# Patient Record
Sex: MALE | Race: OTHER
[De-identification: names, ages, dates, MRNs, and addresses within clinical notes are randomized per-mention and may not be internally consistent; named-entity substitution may affect disease eponyms.]

---

## 2020-05-10 ENCOUNTER — HOSPITAL ENCOUNTER (EMERGENCY)
Dept: HOSPITAL 72 - EMR | Age: 25
LOS: 1 days | Discharge: HOME | End: 2020-05-11
Payer: SELF-PAY

## 2020-05-10 VITALS — SYSTOLIC BLOOD PRESSURE: 130 MMHG | DIASTOLIC BLOOD PRESSURE: 84 MMHG

## 2020-05-10 VITALS — DIASTOLIC BLOOD PRESSURE: 98 MMHG | SYSTOLIC BLOOD PRESSURE: 120 MMHG

## 2020-05-10 VITALS — DIASTOLIC BLOOD PRESSURE: 103 MMHG | SYSTOLIC BLOOD PRESSURE: 143 MMHG

## 2020-05-10 VITALS — DIASTOLIC BLOOD PRESSURE: 89 MMHG | SYSTOLIC BLOOD PRESSURE: 137 MMHG

## 2020-05-10 VITALS — SYSTOLIC BLOOD PRESSURE: 127 MMHG | DIASTOLIC BLOOD PRESSURE: 82 MMHG

## 2020-05-10 VITALS — SYSTOLIC BLOOD PRESSURE: 138 MMHG | DIASTOLIC BLOOD PRESSURE: 98 MMHG

## 2020-05-10 VITALS — SYSTOLIC BLOOD PRESSURE: 143 MMHG | DIASTOLIC BLOOD PRESSURE: 103 MMHG

## 2020-05-10 VITALS — SYSTOLIC BLOOD PRESSURE: 124 MMHG | DIASTOLIC BLOOD PRESSURE: 84 MMHG

## 2020-05-10 VITALS — DIASTOLIC BLOOD PRESSURE: 82 MMHG | SYSTOLIC BLOOD PRESSURE: 127 MMHG

## 2020-05-10 VITALS — SYSTOLIC BLOOD PRESSURE: 124 MMHG | DIASTOLIC BLOOD PRESSURE: 96 MMHG

## 2020-05-10 VITALS — SYSTOLIC BLOOD PRESSURE: 130 MMHG | DIASTOLIC BLOOD PRESSURE: 65 MMHG

## 2020-05-10 VITALS — DIASTOLIC BLOOD PRESSURE: 92 MMHG | SYSTOLIC BLOOD PRESSURE: 140 MMHG

## 2020-05-10 VITALS — SYSTOLIC BLOOD PRESSURE: 120 MMHG | DIASTOLIC BLOOD PRESSURE: 98 MMHG

## 2020-05-10 VITALS — WEIGHT: 315 LBS | HEIGHT: 68 IN | BODY MASS INDEX: 47.74 KG/M2

## 2020-05-10 VITALS — DIASTOLIC BLOOD PRESSURE: 48 MMHG | SYSTOLIC BLOOD PRESSURE: 136 MMHG

## 2020-05-10 VITALS — SYSTOLIC BLOOD PRESSURE: 138 MMHG | DIASTOLIC BLOOD PRESSURE: 99 MMHG

## 2020-05-10 VITALS — DIASTOLIC BLOOD PRESSURE: 75 MMHG | SYSTOLIC BLOOD PRESSURE: 134 MMHG

## 2020-05-10 VITALS — SYSTOLIC BLOOD PRESSURE: 140 MMHG | DIASTOLIC BLOOD PRESSURE: 92 MMHG

## 2020-05-10 VITALS — SYSTOLIC BLOOD PRESSURE: 122 MMHG | DIASTOLIC BLOOD PRESSURE: 94 MMHG

## 2020-05-10 VITALS — SYSTOLIC BLOOD PRESSURE: 110 MMHG | DIASTOLIC BLOOD PRESSURE: 92 MMHG

## 2020-05-10 VITALS — DIASTOLIC BLOOD PRESSURE: 78 MMHG | SYSTOLIC BLOOD PRESSURE: 132 MMHG

## 2020-05-10 DIAGNOSIS — F91.9: Primary | ICD-10-CM

## 2020-05-10 DIAGNOSIS — R45.851: ICD-10-CM

## 2020-05-10 DIAGNOSIS — F23: ICD-10-CM

## 2020-05-10 LAB
ADD MANUAL DIFF: NO
ALBUMIN SERPL-MCNC: 4.1 G/DL (ref 3.4–5)
ALBUMIN/GLOB SERPL: 1.1 {RATIO} (ref 1–2.7)
ALP SERPL-CCNC: 66 U/L (ref 46–116)
ALT SERPL-CCNC: 99 U/L (ref 12–78)
ANION GAP SERPL CALC-SCNC: 11 MMOL/L (ref 5–15)
APPEARANCE UR: CLEAR
APTT PPP: (no result) S
AST SERPL-CCNC: 42 U/L (ref 15–37)
BASOPHILS NFR BLD AUTO: 1 % (ref 0–2)
BILIRUB SERPL-MCNC: 0.4 MG/DL (ref 0.2–1)
BUN SERPL-MCNC: 9 MG/DL (ref 7–18)
CALCIUM SERPL-MCNC: 9.4 MG/DL (ref 8.5–10.1)
CHLORIDE SERPL-SCNC: 106 MMOL/L (ref 98–107)
CO2 SERPL-SCNC: 24 MMOL/L (ref 21–32)
CREAT SERPL-MCNC: 0.9 MG/DL (ref 0.55–1.3)
EOSINOPHIL NFR BLD AUTO: 0.7 % (ref 0–3)
ERYTHROCYTE [DISTWIDTH] IN BLOOD BY AUTOMATED COUNT: 11.7 % (ref 11.6–14.8)
GLOBULIN SER-MCNC: 3.9 G/DL
GLUCOSE UR STRIP-MCNC: NEGATIVE MG/DL
HCT VFR BLD CALC: 43.8 % (ref 42–52)
HGB BLD-MCNC: 16.2 G/DL (ref 14.2–18)
KETONES UR QL STRIP: NEGATIVE
LEUKOCYTE ESTERASE UR QL STRIP: NEGATIVE
LYMPHOCYTES NFR BLD AUTO: 21.4 % (ref 20–45)
MCV RBC AUTO: 91 FL (ref 80–99)
MONOCYTES NFR BLD AUTO: 6.7 % (ref 1–10)
NEUTROPHILS NFR BLD AUTO: 70.3 % (ref 45–75)
NITRITE UR QL STRIP: NEGATIVE
PH UR STRIP: 7 [PH] (ref 4.5–8)
PLATELET # BLD: 225 K/UL (ref 150–450)
POTASSIUM SERPL-SCNC: 3.9 MMOL/L (ref 3.5–5.1)
PROT UR QL STRIP: NEGATIVE
RBC # BLD AUTO: 4.81 M/UL (ref 4.7–6.1)
SODIUM SERPL-SCNC: 141 MMOL/L (ref 136–145)
SP GR UR STRIP: 1 (ref 1–1.03)
UROBILINOGEN UR-MCNC: NORMAL MG/DL (ref 0–1)
WBC # BLD AUTO: 8.3 K/UL (ref 4.8–10.8)

## 2020-05-10 PROCEDURE — 36415 COLL VENOUS BLD VENIPUNCTURE: CPT

## 2020-05-10 PROCEDURE — 85025 COMPLETE CBC W/AUTO DIFF WBC: CPT

## 2020-05-10 PROCEDURE — 80053 COMPREHEN METABOLIC PANEL: CPT

## 2020-05-10 PROCEDURE — 96372 THER/PROPH/DIAG INJ SC/IM: CPT

## 2020-05-10 PROCEDURE — 80307 DRUG TEST PRSMV CHEM ANLYZR: CPT

## 2020-05-10 PROCEDURE — 81003 URINALYSIS AUTO W/O SCOPE: CPT

## 2020-05-10 PROCEDURE — 99285 EMERGENCY DEPT VISIT HI MDM: CPT

## 2020-05-10 NOTE — NUR
ED Nurse Note:



Pt informed of ERMD desire to initiate additional 1L of NS and need for new iv 
access establishment. Pt refused. PT medically cleared. ERMD notified of 
refusal and clearance.

## 2020-05-10 NOTE — NUR
ED Nurse Note:



Pt resting in bed. Pt offered food and drinks, pt refused. pt offered 
assistance to restroom, pt refused. ERMD aware. pt aao x 4, calm and 
cooperative. will continue to monitor.

## 2020-05-10 NOTE — NUR
ED Nurse Note:



Pt came in with gray shirt and shorts but shirt was teared up; clothes remained 
with pt right now. Black shoes placed on psych locker #3.

## 2020-05-10 NOTE — NUR
ED Nurse Note:



Noted pt with mild anxiety; pt stated, "I need to get out of here, I need to 
call my mom."

## 2020-05-10 NOTE — NUR
ED Nurse Note:



Pt assessed and informed of possible restraint removal parameters. Pt agreed. 
pt able to fully verbalize needs and thought process. Pt pleasant and polite. 
Pt resting in bed comfortably, compliant with following commands and 
guidelines. Sitter placed at bedside, restraints removed. will continue to 
monitor.

## 2020-05-10 NOTE — NUR
ED Nurse Note:



Pt was found not on his bed, stated "I want to leave!", called security; 
notified ERMD and charge nurse; pt uncompliant to care, consistently yelling. 
Placed back to bed; safety measures in placed. Skin intact; no redness nor 
swelling on bilateral wrist. Dim lights on pt's room. VSS, on RA, NAD. Will 
contine to monitor.

## 2020-05-10 NOTE — NUR
ED Nurse Note:



Pt was brought in ambulance from home d/t behavioral complaint. Per EMS pt 
smoked meth yesterday and hear voices. Pt wants to hang himself. Placed on bed 
and gown; hooked to cardiac monitor. VSS, on RA, afebrile on triage. Safety 
measures in placed. Will continue to monitor.

## 2020-05-10 NOTE — EMERGENCY ROOM REPORT
History of Present Illness


General


Chief Complaint:  Behavioral Complaint


Source:  Patient, EMS


 (Joe Rangel MD)





Present Illness


HPI


Patient is a 24-year-old male brought in by EMS with [employer] after increased 

agitation at home.  Patient reportedly had been in a psychiatric facility in 

the past.  Patient was placed on a 5150 hold by [employer].  Reportedly had 

threatened to harm himself.  Reportedly had been using methamphetamine.  

Patient denies any suicidal thoughts or thoughts of self-harm and states he was 

only eating cake.


 (Joe Rangel MD)


Allergies:  


Coded Allergies:  


     No Known Allergies (Unverified , 5/10/20)





COVID-19 Screening


Contact w/high risk pt:  No


Recent Travel to affected area:  No


Experienced COVID-19 symptoms?:  No


 (Joe Rangel MD)





Patient History


Past Medical History:  see triage record


Reviewed Nursing Documentation:  PMH: Agreed; PSxH: Agreed (Joe Rangel MD)





Nursing Documentation-PMH


Past Medical History:  No Stated History


 (Joe Rangel MD)





Review of Systems


All Other Systems:  negative except mentioned in HPI


 (Joe Rangel MD)





Physical Exam





Vital Signs








  Date Time  Temp Pulse Resp B/P (MAP) Pulse Ox O2 Delivery O2 Flow Rate FiO2


 


5/10/20 10:35 99.1 103 18 124/84 (97) 98 Room Air  








Sp02 EP Interpretation:  reviewed, normal


General Appearance:  normal inspection, alert, obese


Head:  atraumatic


ENT:  normal ENT inspection, hearing grossly normal, normal voice


Neck:  normal inspection, full range of motion, supple, no bony tend


Respiratory:  normal inspection, lungs clear, normal breath sounds, no 

respiratory distress, no retraction, no wheezing


Cardiovascular #1:  regular rate, rhythm, no edema


Gastrointestinal:  normal inspection, normal bowel sounds, non tender, soft, no 

guarding, no hernia


Genitourinary:  no CVA tenderness


Musculoskeletal:  normal inspection, back normal, normal range of motion


Neurologic:  alert, motor strength/tone normal, CNs III-XII nml as tested, 

sensory intact, responsive, speech normal, normal inspection


Psychiatric:  mood/affect normal, anxious


 (Joe Rangel MD)





Medical Decision Making


Diagnostic Impression:  


 Primary Impression:  


 Behavioral disorder


 Additional Impression:  


 Psychosis


ER Course


Patient presented for agitation.  Differential diagnoses include substance abuse

, psychosis, bipolar disorder, depression, malingering .  because of complexity 

of patient's case laboratory tests and imaging studies were ordered Patient was 

noted to have increased agitation and was given Benadryl held Ativan and 

Haldol.  He was placed in restraints.  Patient was noted to be 5150 hold placed 

by [employer].  Patient was medically cleared for psychiatric evaluation as needed.  

Currently denies suicidal thoughts.





Labs








Test


  5/10/20


10:45


 


White Blood Count


  8.3 K/UL


(4.8-10.8)


 


Red Blood Count


  4.81 M/UL


(4.70-6.10)


 


Hemoglobin


  16.2 G/DL


(14.2-18.0)


 


Hematocrit


  43.8 %


(42.0-52.0)


 


Mean Corpuscular Volume 91 FL (80-99) 


 


Mean Corpuscular Hemoglobin


  33.6 PG


(27.0-31.0)


 


Mean Corpuscular Hemoglobin


Concent 36.9 G/DL


(32.0-36.0)


 


Red Cell Distribution Width


  11.7 %


(11.6-14.8)


 


Platelet Count


  225 K/UL


(150-450)


 


Mean Platelet Volume


  7.6 FL


(6.5-10.1)


 


Neutrophils (%) (Auto)


  70.3 %


(45.0-75.0)


 


Lymphocytes (%) (Auto)


  21.4 %


(20.0-45.0)


 


Monocytes (%) (Auto)


  6.7 %


(1.0-10.0)


 


Eosinophils (%) (Auto)


  0.7 %


(0.0-3.0)


 


Basophils (%) (Auto)


  1.0 %


(0.0-2.0)


 


Urine Color Pale yellow 


 


Urine Appearance Clear 


 


Urine pH 7 (4.5-8.0) 


 


Urine Specific Gravity


  1.005


(1.005-1.035)


 


Urine Protein


  Negative


(NEGATIVE)


 


Urine Glucose (UA)


  Negative


(NEGATIVE)


 


Urine Ketones


  Negative


(NEGATIVE)


 


Urine Blood


  Negative


(NEGATIVE)


 


Urine Nitrite


  Negative


(NEGATIVE)


 


Urine Bilirubin


  Negative


(NEGATIVE)


 


Urine Urobilinogen


  Normal MG/DL


(0.0-1.0)


 


Urine Leukocyte Esterase


  Negative


(NEGATIVE)


 


Sodium Level


  141 MMOL/L


(136-145)


 


Potassium Level


  3.9 MMOL/L


(3.5-5.1)


 


Chloride Level


  106 MMOL/L


()


 


Carbon Dioxide Level


  24 MMOL/L


(21-32)


 


Anion Gap


  11 mmol/L


(5-15)


 


Blood Urea Nitrogen 9 mg/dL (7-18) 


 


Creatinine


  0.9 MG/DL


(0.55-1.30)


 


Estimat Glomerular Filtration


Rate > 60 mL/min


(>60)


 


Glucose Level


  104 MG/DL


()


 


Calcium Level


  9.4 MG/DL


(8.5-10.1)


 


Total Bilirubin


  0.4 MG/DL


(0.2-1.0)


 


Aspartate Amino Transf


(AST/SGOT) 42 U/L (15-37) 


 


 


Alanine Aminotransferase


(ALT/SGPT) 99 U/L (12-78) 


 


 


Alkaline Phosphatase


  66 U/L


()


 


Total Protein


  8.0 G/DL


(6.4-8.2)


 


Albumin


  4.1 G/DL


(3.4-5.0)


 


Globulin 3.9 g/dL 


 


Albumin/Globulin Ratio 1.1 (1.0-2.7) 


 


Salicylates Level


  0.5 ug/mL


(2.8-20)


 


Urine Opiates Screen


  Negative


(NEGATIVE)


 


Acetaminophen Level


  < 2 MCG/ML


(10-30)


 


Urine Barbiturates Screen


  Negative


(NEGATIVE)


 


Phencyclidine (PCP) Screen


  Negative


(NEGATIVE)


 


Urine Amphetamines Screen


  Negative


(NEGATIVE)


 


Urine Benzodiazepines Screen


  Negative


(NEGATIVE)


 


Urine Cocaine Screen


  Negative


(NEGATIVE)


 


Urine Marijuana (THC) Screen


  Positive


(NEGATIVE)


 


Serum Alcohol < 3 mg/dL 








 (Joe Rangel MD)


ER Course


I reevaluated patient in the morning.  On 5150 hold.  Patient not endorsing SI 

or HI.





I do not believe patient requires emergent psychiatric placement.  We will call 

Dr. Parekh to clear patient.








 


 (Casey Veras MD)


ER Course


Patient signed out to me from previous provider pending psychiatric evaluation.

  





Briefly, the patient is on a 5150 hold for suicidal thoughts.  He has been in 

the emergency department for nearly 30 hours.  Denies SI/HI at this time.  Dr. Parekh has evaluated the patient and lifted the 5150 hold.  Patient will be 

discharged to follow-up with his mental health team.  No complaints or health 

concerns from the patient at this time.


 (Harpreet Langston MD)





Last Vital Signs








  Date Time  Temp Pulse Resp B/P (MAP) Pulse Ox O2 Delivery O2 Flow Rate FiO2


 


5/10/20 11:00 99.1  18 124/84 98 Room Air  


 


5/10/20 11:00  103      








 (Joe Rangel MD)


Disposition:  HOME, SELF-CARE


Condition:  Stable


Referrals:  


NOT CHOSEN IPA/MD,REFERRING (PCP)











Joe Rangel MD May 10, 2020 11:35


Casey Veras MD May 11, 2020 14:13


Harpreet Langston MD May 11, 2020 16:06

## 2020-05-11 VITALS — DIASTOLIC BLOOD PRESSURE: 60 MMHG | SYSTOLIC BLOOD PRESSURE: 131 MMHG

## 2020-05-11 VITALS — DIASTOLIC BLOOD PRESSURE: 56 MMHG | SYSTOLIC BLOOD PRESSURE: 132 MMHG

## 2020-05-11 VITALS — SYSTOLIC BLOOD PRESSURE: 129 MMHG | DIASTOLIC BLOOD PRESSURE: 76 MMHG

## 2020-05-11 VITALS — SYSTOLIC BLOOD PRESSURE: 134 MMHG | DIASTOLIC BLOOD PRESSURE: 70 MMHG

## 2020-05-11 VITALS — SYSTOLIC BLOOD PRESSURE: 130 MMHG | DIASTOLIC BLOOD PRESSURE: 53 MMHG

## 2020-05-11 VITALS — SYSTOLIC BLOOD PRESSURE: 128 MMHG | DIASTOLIC BLOOD PRESSURE: 74 MMHG

## 2020-05-11 NOTE — NUR
ED Nurse Note:



Pt sleeping in bed, vss no ss of distress noted. safety precautions in place, 
sitter at bedside.

## 2020-05-11 NOTE — NUR
ED Nurse Note:



Pt yelling and screaming and states that he is doing that because he is bored. 
Dr Veras at the bed side and explained to pt that  we still are waiting for 
a placement bed. Pt verbalized understanding.

## 2020-05-11 NOTE — NUR
ED Nurse Note:dr. Parekh came to evaluate pt. ,and she had cleared him for d/c 
home, pt. was given food and his clothes from [facility]

## 2020-05-11 NOTE — NUR
ED Nurse Note:





pt informed of need to place new IV for maintainance IV fluids. Pt refused. 
ERMD aware.

## 2020-05-11 NOTE — NUR
ED Nurse Note:



Pt offered food and drinks, pt accepted. Pt given sandwich, juice, and water. 
Pt denied need for restroom. Pt stated he just wants to sleep. Pt resting 
comfortably in bed. VSS no ss of distress noted.

## 2020-05-11 NOTE — NUR
ED Nurse Note:



RECEIVED REPORT FROM LETICIA CROWLEY, PT SITTING ON CHAIR WITH SITTER. PT IS WITH NO 
DISTRESS. CALM AT THIS TIME AND EATING HIS BREAKFAST.

## 2020-05-11 NOTE — NUR
ED Nurse Note:

pt. kept on going to the nursing station asking for his shoes. Dr. hermosillo 
explained to him that he is not allowed to have his belongings until he was 
cleared out by Psych doctor. Attempted to ask pt. to return to his room but pt. 
escaped through the ambulance entrance. ER tech attempted to stop him. called 
 but pt. was nowehere to be found. Called 911. Spoke with 
 353 and was told that police will be sent to the hospital for 
investigation

## 2020-05-11 NOTE — NUR
ER DISCHARGE NOTE:

Patient is cleared to be discharged per ERMD, pt is aox4, on room air, with 
stable vital signs. pt was given dc instructions, pt was able to verbalize 
understanding, pt is able to ambulate with steady gait. pt took all belongings.

## 2020-06-17 ENCOUNTER — HOSPITAL ENCOUNTER (EMERGENCY)
Dept: HOSPITAL 72 - EMR | Age: 25
LOS: 2 days | Discharge: PSYCHIATRIC OR SUBSTANCE USE FACILITY | End: 2020-06-19
Payer: SELF-PAY

## 2020-06-17 VITALS — HEIGHT: 68 IN | WEIGHT: 300 LBS | BODY MASS INDEX: 45.47 KG/M2

## 2020-06-17 VITALS — DIASTOLIC BLOOD PRESSURE: 49 MMHG | SYSTOLIC BLOOD PRESSURE: 113 MMHG

## 2020-06-17 DIAGNOSIS — F19.90: ICD-10-CM

## 2020-06-17 DIAGNOSIS — F23: ICD-10-CM

## 2020-06-17 DIAGNOSIS — F91.9: Primary | ICD-10-CM

## 2020-06-17 DIAGNOSIS — Z72.0: ICD-10-CM

## 2020-06-17 LAB
ADD MANUAL DIFF: NO
ALBUMIN SERPL-MCNC: 3.9 G/DL (ref 3.4–5)
ALBUMIN/GLOB SERPL: 0.9 {RATIO} (ref 1–2.7)
ALP SERPL-CCNC: 59 U/L (ref 46–116)
ALT SERPL-CCNC: 96 U/L (ref 12–78)
ANION GAP SERPL CALC-SCNC: 10 MMOL/L (ref 5–15)
APPEARANCE UR: CLEAR
APTT PPP: YELLOW S
AST SERPL-CCNC: 56 U/L (ref 15–37)
BASOPHILS NFR BLD AUTO: 0.9 % (ref 0–2)
BILIRUB SERPL-MCNC: 0.8 MG/DL (ref 0.2–1)
BUN SERPL-MCNC: 17 MG/DL (ref 7–18)
CALCIUM SERPL-MCNC: 9.1 MG/DL (ref 8.5–10.1)
CHLORIDE SERPL-SCNC: 106 MMOL/L (ref 98–107)
CO2 SERPL-SCNC: 27 MMOL/L (ref 21–32)
CREAT SERPL-MCNC: 1 MG/DL (ref 0.55–1.3)
EOSINOPHIL NFR BLD AUTO: 0.5 % (ref 0–3)
ERYTHROCYTE [DISTWIDTH] IN BLOOD BY AUTOMATED COUNT: 13.9 % (ref 11.6–14.8)
GLOBULIN SER-MCNC: 4.3 G/DL
GLUCOSE UR STRIP-MCNC: NEGATIVE MG/DL
HCT VFR BLD CALC: 48.7 % (ref 42–52)
HGB BLD-MCNC: 15.5 G/DL (ref 14.2–18)
KETONES UR QL STRIP: (no result)
LEUKOCYTE ESTERASE UR QL STRIP: (no result)
LYMPHOCYTES NFR BLD AUTO: 24.3 % (ref 20–45)
MCV RBC AUTO: 104 FL (ref 80–99)
MONOCYTES NFR BLD AUTO: 5.2 % (ref 1–10)
NEUTROPHILS NFR BLD AUTO: 69.1 % (ref 45–75)
NITRITE UR QL STRIP: NEGATIVE
PH UR STRIP: 6 [PH] (ref 4.5–8)
PLATELET # BLD: 261 K/UL (ref 150–450)
POTASSIUM SERPL-SCNC: 5.4 MMOL/L (ref 3.5–5.1)
PROT UR QL STRIP: (no result)
RBC # BLD AUTO: 4.7 M/UL (ref 4.7–6.1)
SODIUM SERPL-SCNC: 142 MMOL/L (ref 136–145)
SP GR UR STRIP: 1.02 (ref 1–1.03)
UROBILINOGEN UR-MCNC: NORMAL MG/DL (ref 0–1)
WBC # BLD AUTO: 9.1 K/UL (ref 4.8–10.8)

## 2020-06-17 PROCEDURE — 81003 URINALYSIS AUTO W/O SCOPE: CPT

## 2020-06-17 PROCEDURE — 85025 COMPLETE CBC W/AUTO DIFF WBC: CPT

## 2020-06-17 PROCEDURE — 36415 COLL VENOUS BLD VENIPUNCTURE: CPT

## 2020-06-17 PROCEDURE — 80053 COMPREHEN METABOLIC PANEL: CPT

## 2020-06-17 PROCEDURE — 99285 EMERGENCY DEPT VISIT HI MDM: CPT

## 2020-06-17 PROCEDURE — 80307 DRUG TEST PRSMV CHEM ANLYZR: CPT

## 2020-06-17 NOTE — NUR
ED Nurse Note:



Pt brought into ED by SHELIA from home for behavioral complaint. Pt placed on 
5150 psych hold by [employer] for danger to self and others. Per [employer], pt stated he 
was hearing voices and the mother had disclosed to [employer] that she was scared for 
her own safety and the pts. Pt denies wanting to harm others or himself upon ED 
assessment by RN. Pt also denies drug or alcohol use tonight. Pt is aaox4, 
breathing is normal and unlabored. No cough, SOB or fever noted. Pt placed in 
room with suicide and safety precuations in place and put into gown. Pt 
displays attention seeking behaviors such as using a loud voice, but is 
otherwise cooperative. Blood drawn and sent to lab. RN bedside as sitter. Will 
continue to closely monitor.

## 2020-06-17 NOTE — EMERGENCY ROOM REPORT
History of Present Illness


General


Chief Complaint:  Behavioral Complaint


Source:  Patient, EMS


 (Joe Rangel MD)


Source:  Patient, EMS, [employer] Enforcement


 (Ye Bernardo MD)





Present Illness


HPI


Patient is a 24-year-old male who presented for increased agitation.  Patient 

had been previously on a 5150 hold.  Patient had been brought in after mom 

called.  Had previous psychiatric hospitalizations due to mental illness.  

Previous ER visit for 5150 hold in the past.  Reportedly had use of substances 

in the past.


 (Joe Rangel MD)


HPI


This is a 24-year-old male with psychiatric history.  He presents with chief 

complaint of agitation and danger to self and others.  His mom called 911 

because he was acting erratically.  Supposedly there was methamphetamine use.  

She said that she felt danger to herself and called 911.  Please place him on a 

5150.  Patient said that she did not give him his Abilify.  He denies suicidal 

thoughts homicidal thought.  He denies any drug use or alcohol use.


 (Ye Bernardo MD)


Allergies:  


Coded Allergies:  


     No Known Allergies (Unverified , 5/10/20)





COVID-19 Screening


COVID-19 risk:Contact w/high r:  No


COVID-19 risk:Travel to affect:  No


Has patient experienced corona:  No


COVID-19 Testing performed PTA:  No


 (Joe Rangel MD)


COVID-19 risk:Contact w/high r:  No


COVID-19 risk:Travel to affect:  No


Has patient experienced feliciano:  No


 (Ye Bernardo MD)





Patient History


Past Medical History:  see triage record


Reviewed Nursing Documentation:  PMH: Agreed; PSxH: Agreed (Joe Rangel MD)


Past Medical History:  see triage record, old chart reviewed


Past Surgical History:  none


Family History:  none


Social History:  tobacco use, drug use


Immunizations:  other


Reviewed Nursing Documentation:  PMH: Agreed; PSxH: Agreed (Ye Bernardo MD)





Review of Systems


ENT:  Denies: sore throat


Cardiovascular:  Denies: chest pain, palpitations


Gastrointestinal/Abdominal:  Denies: nausea, vomiting, diarrhea


Musculoskeletal:  Denies: back problems


Skin:  Denies: rash


Neurological:  Denies: HA, seizures


All Other Systems:  negative except mentioned in HPI


 (Ye Bernardo MD)





Physical Exam





Vital Signs








  Date Time  Temp Pulse Resp B/P (MAP) Pulse Ox O2 Delivery O2 Flow Rate FiO2


 


6/17/20 21:17 98.2 122 18 113/49 (70) 98 Room Air  








Sp02 EP Interpretation:  reviewed, normal


General Appearance:  alert/responsive, no apparent distress, GCS 15, non-toxic


Head:  atraumatic


Eyes:  PERRL, lids + conjunctiva normal


ENT:  hearing intact, no angioedema


Neck:  supple/symm/no masses, no meningismus


Respiratory:  effort normal, no wheezing, chest symmetrical


Cardiovascular:  regular rate, rhythm, no edema


Cardiovascular #2:  2+ carotid (R), 2+ carotid (L), 2+ dorsalis pedis (R), 2+ 

dorsalis pedis (L)


Gastrointestinal:  non-tender, no mass, non-distended, no rebound/guarding, 

normal bowel sounds


Musculoskeletal:  gait & station normal, normal ROM, strength & tone normal, non

-tender


Neurologic:  oriented x3, sensory intact, normal speech


Psychiatric:  other


Skin:  no rash, well hydrated


Lymphatic:  normal inspection


 (Joe Rangel MD)


Vitals unremarkable


Sp02 EP Interpretation:  reviewed, normal


General Appearance:  alert/responsive, no apparent distress, non-toxic


Head:  normocephalic, atraumatic


Eyes:  PERRL, EOMI


ENT:  oropharynx normal


Neck:  supple/symm/no masses


Respiratory:  effort normal, no rhonchi, no wheezing


Cardiovascular:  no murmur, gallop, rub


Gastrointestinal:  non-tender, no mass, non-distended, no rebound/guarding, 

normal bowel sounds


Musculoskeletal:  gait & station normal


Neurologic:  oriented x3, sensory intact, motor strength/tone normal


Psychiatric:  other - Flat affect


Skin:  no rash, normal palpation


 (Ye Bernardo MD)





Medical Decision Making


Diagnostic Impression:  


 Primary Impression:  


 Behavioral disorder


 Additional Impressions:  


 Psychosis


 Qualified Codes:  F23 - Brief psychotic disorder


 At risk for danger to others


ER Course


Patient was seen by me briefly and was endorsed to Dr. Bernardo.


 (Joe Rangel MD)


ER Course


Patient presents as a 5150 for danger to self and other.  He is calm here.  I 

did give him a dose of Zyprexa here.  Laboratory data is unremarkable.  Alcohol 

and drug screen negative.  Patient is medically cleared for psychiatric 

evaluation.


 (Ye Bernardo MD)


ER Course


Please see above note.


Patient was agitated.  He thought that staff were laughing at him.


He is he was reassured and calmed.


He states he takes Latuda and Abilify.


He had already received Zyprexa 5mg.


He is given half dose of his usual Abilify and also 1 mg of Ativan.





Patient more calm.  Attempting to place.  Tested for COVID-19 which is negative.





Patient agitated and not calming.  Sedated with Haldol, Benadryl and Ativan.  

Await placement.


Signed out to Dr. Langston.


 (Chente Vanegas MD)





Last Vital Signs








  Date Time  Temp Pulse Resp B/P (MAP) Pulse Ox O2 Delivery O2 Flow Rate FiO2


 


6/17/20 21:17 98.2 122 18 113/49 (70) 98 Room Air  








 (Joe Rangel MD)


Status:  unchanged


 (Ye Bernardo MD)





Last Vital Signs








  Date Time  Temp Pulse Resp B/P (MAP) Pulse Ox O2 Delivery O2 Flow Rate FiO2


 


6/18/20 14:23 98.7 62 17 120/61 99 Room Air  








Status:  improved


 (Chente Vanegas MD)


Disposition:  PSYCH HOSP/UNIT


Condition:  Stable











Joe Rangel MD Jun 17, 2020 21:28


Ye Bernardo MD Jun 17, 2020 23:46


Chente Vanegas MD Jun 18, 2020 06:46

## 2020-06-18 VITALS — DIASTOLIC BLOOD PRESSURE: 70 MMHG | SYSTOLIC BLOOD PRESSURE: 117 MMHG

## 2020-06-18 VITALS — SYSTOLIC BLOOD PRESSURE: 115 MMHG | DIASTOLIC BLOOD PRESSURE: 65 MMHG

## 2020-06-18 VITALS — SYSTOLIC BLOOD PRESSURE: 120 MMHG | DIASTOLIC BLOOD PRESSURE: 61 MMHG

## 2020-06-18 VITALS — SYSTOLIC BLOOD PRESSURE: 122 MMHG | DIASTOLIC BLOOD PRESSURE: 63 MMHG

## 2020-06-18 VITALS — SYSTOLIC BLOOD PRESSURE: 127 MMHG | DIASTOLIC BLOOD PRESSURE: 66 MMHG

## 2020-06-18 VITALS — SYSTOLIC BLOOD PRESSURE: 127 MMHG | DIASTOLIC BLOOD PRESSURE: 67 MMHG

## 2020-06-18 NOTE — NUR
ED Nurse Note:



Pt is sleeping at this time. NAD. Sitter is bedside with safety measures in 
place.

## 2020-06-18 NOTE — NUR
ED Nurse Note:

Patient still sleeping, no s/s of acute distress or disturbance. Will continue 
to monitor as sitter is at bedside.

## 2020-06-18 NOTE — NUR
ED Nurse Note:

Both side rails lifted as patient was shifting in bed. Patient awake and alert, 
requested water. Water provided. Will continue to monitor, sitter still at 
bedside post 15 minute break.

## 2020-06-19 VITALS — DIASTOLIC BLOOD PRESSURE: 71 MMHG | SYSTOLIC BLOOD PRESSURE: 128 MMHG

## 2020-06-19 VITALS — DIASTOLIC BLOOD PRESSURE: 74 MMHG | SYSTOLIC BLOOD PRESSURE: 134 MMHG

## 2020-06-19 VITALS — SYSTOLIC BLOOD PRESSURE: 132 MMHG | DIASTOLIC BLOOD PRESSURE: 72 MMHG

## 2020-06-19 VITALS — SYSTOLIC BLOOD PRESSURE: 136 MMHG | DIASTOLIC BLOOD PRESSURE: 78 MMHG

## 2020-06-19 NOTE — NUR
ED Nurse Note:



RECEIVED REPORT FROM TITO CROWLEY. PT IS CALM AND RESTING. VSS, NAD. SITTER AT 
BEDSIDE

## 2020-06-19 NOTE — NUR
ED Nurse Note:

Patient voided at bedside into urinal. Sitter still at bedside. void 
documented. Patient also request water, request accomodated.  Will continue to 
monitor.

## 2020-06-19 NOTE — NUR
ED Nurse Note:



received report from Cl Engel RN for continuity of care. Pt on bed, awake and 
alert, sitting, VSS, on RA, NAD noted. Sitter at bedside at all times.

## 2020-06-19 NOTE — NUR
ED Nurse Note:



received order for non-formulary med; Latuda 20mg 1 TAB given to pt. Med taken 
from pt's med inventory. ERMD and Charge nurse aware.

## 2020-06-19 NOTE — NUR
ED Nurse Note:

Patient is beginnging to cause verbal disturbances. Due to track record of 
behavior, security contacted to provide a visible show of force to discourage 
future outbursts. Patient complied with direction to climb back in to bed and 
try to go back to sleep. Sitter still at bedside. Will continue to monitor 
throughout the night.

## 2020-06-19 NOTE — NUR
ED Nurse Note:

Patient still sleeping, no s/s of acute distress. will continue to monitor, 
siter is at bedside.

## 2020-06-19 NOTE — NUR
ED Nurse Note:



Patient was transfered to [facility] via 
private [facility] ambulance # [id number], with all belongings. Patient AAO x4, VSS at 
this time, no acute disstress noted.

## 2020-06-19 NOTE — NUR
ED Nurse Note:



report given to CARLINE Kate at [facility] for transfer of care.

## 2020-06-19 NOTE — NUR
ED Nurse Note:

Patient requested third cup of water, request accomodated. will continue to 
monitor. Sitter at bedside.

## 2023-05-16 NOTE — NUR
ED Nurse Note:



received report from Chelsie CROWLEY. Will resume care of patient. vss, nad, calm 
and sleeping. sitter at bedside. Pt is alert and oriented, A&O4, steady gait noted.  [employer] given to patient as per patient's request.